# Patient Record
Sex: MALE | Race: WHITE | NOT HISPANIC OR LATINO
[De-identification: names, ages, dates, MRNs, and addresses within clinical notes are randomized per-mention and may not be internally consistent; named-entity substitution may affect disease eponyms.]

---

## 2021-08-31 ENCOUNTER — APPOINTMENT (OUTPATIENT)
Dept: SURGERY | Facility: CLINIC | Age: 36
End: 2021-08-31
Payer: COMMERCIAL

## 2021-08-31 VITALS
HEIGHT: 75 IN | TEMPERATURE: 97.5 F | BODY MASS INDEX: 25.49 KG/M2 | HEART RATE: 76 BPM | OXYGEN SATURATION: 96 % | WEIGHT: 205 LBS

## 2021-08-31 DIAGNOSIS — R10.11 RIGHT UPPER QUADRANT PAIN: ICD-10-CM

## 2021-08-31 PROBLEM — Z00.00 ENCOUNTER FOR PREVENTIVE HEALTH EXAMINATION: Status: ACTIVE | Noted: 2021-08-31

## 2021-08-31 PROCEDURE — 99243 OFF/OP CNSLTJ NEW/EST LOW 30: CPT

## 2021-08-31 NOTE — PLAN
[FreeTextEntry1] : After examination patient was reassured that there is no evidence of hernia in that region.  Most likely the pain is musculoskeletal in nature secondary to pushing a heavy object.  Symptomatic treatment was explained to the patient.  If pain continues patient was advised to return to the office for further examination or testing if needed.

## 2021-08-31 NOTE — CONSULT LETTER
[Dear  ___] : Dear  [unfilled], [Please see my note below.] : Please see my note below. [Consult Closing:] : Thank you very much for allowing me to participate in the care of this patient.  If you have any questions, please do not hesitate to contact me. [Sincerely,] : Sincerely, [FreeTextEntry3] : Francisco Huang MD, FACS\par [facility] [place] [address]\par

## 2021-08-31 NOTE — HISTORY OF PRESENT ILLNESS
[de-identified] : Patient presents to the office with a history of right sided abdominal pain above the bellybutton on the side.  He was pushing a very heavy object recently and felt pain afterwards.  History of bilateral inguinal hernia repair as a child and then right inguinal hernia repair few years ago.  He also had history of umbilical hernia repair.

## 2021-08-31 NOTE — REASON FOR VISIT
[Initial Evaluation] : an initial evaluation [FreeTextEntry1] : Pt here for possible hernia pt has pain from lower abdomen to groin

## 2021-08-31 NOTE — PHYSICAL EXAM
[Abdominal Masses] : No abdominal masses [Abdomen Tenderness] : ~T ~M No abdominal tenderness [No Rash or Lesion] : No rash or lesion [Alert] : alert [Oriented to Person] : oriented to person [Oriented to Place] : oriented to place [Oriented to Time] : oriented to time [Calm] : calm [de-identified] : Patient is in no acute distress. [de-identified] : There is no evidence of any abdominal wall hernia where patient is mentioning his pain.  There is no recurrence of umbilical hernia however some weakness is present.  There is no evidence of recurrence of right inguinal hernia as well.